# Patient Record
Sex: MALE | Race: WHITE | Employment: FULL TIME | URBAN - METROPOLITAN AREA
[De-identification: names, ages, dates, MRNs, and addresses within clinical notes are randomized per-mention and may not be internally consistent; named-entity substitution may affect disease eponyms.]

---

## 2018-02-28 ENCOUNTER — NURSE TRIAGE (OUTPATIENT)
Dept: NURSING | Facility: CLINIC | Age: 38
End: 2018-02-28

## 2018-02-28 ENCOUNTER — HOSPITAL ENCOUNTER (EMERGENCY)
Facility: CLINIC | Age: 38
Discharge: HOME | End: 2018-02-28
Attending: EMERGENCY MEDICINE | Admitting: EMERGENCY MEDICINE
Payer: COMMERCIAL

## 2018-02-28 VITALS
RESPIRATION RATE: 16 BRPM | BODY MASS INDEX: 32.23 KG/M2 | TEMPERATURE: 97.9 F | HEART RATE: 99 BPM | HEIGHT: 72 IN | WEIGHT: 238 LBS | DIASTOLIC BLOOD PRESSURE: 98 MMHG | OXYGEN SATURATION: 100 % | SYSTOLIC BLOOD PRESSURE: 142 MMHG

## 2018-02-28 DIAGNOSIS — S06.0X9A CONCUSSION WITH LOSS OF CONSCIOUSNESS, INITIAL ENCOUNTER: ICD-10-CM

## 2018-02-28 DIAGNOSIS — S01.01XA LACERATION OF SCALP WITHOUT FOREIGN BODY, INITIAL ENCOUNTER: ICD-10-CM

## 2018-02-28 PROCEDURE — 90715 TDAP VACCINE 7 YRS/> IM: CPT | Performed by: EMERGENCY MEDICINE

## 2018-02-28 PROCEDURE — 12002 RPR S/N/AX/GEN/TRNK2.6-7.5CM: CPT

## 2018-02-28 PROCEDURE — 99283 EMERGENCY DEPT VISIT LOW MDM: CPT | Mod: 25

## 2018-02-28 PROCEDURE — 90471 IMMUNIZATION ADMIN: CPT

## 2018-02-28 PROCEDURE — 25000132 ZZH RX MED GY IP 250 OP 250 PS 637: Performed by: EMERGENCY MEDICINE

## 2018-02-28 PROCEDURE — 25000128 H RX IP 250 OP 636: Performed by: EMERGENCY MEDICINE

## 2018-02-28 RX ORDER — HYDROCODONE BITARTRATE AND ACETAMINOPHEN 5; 325 MG/1; MG/1
1 TABLET ORAL ONCE
Status: COMPLETED | OUTPATIENT
Start: 2018-02-28 | End: 2018-02-28

## 2018-02-28 RX ORDER — IBUPROFEN 400 MG/1
800 TABLET, FILM COATED ORAL ONCE
Status: COMPLETED | OUTPATIENT
Start: 2018-02-28 | End: 2018-02-28

## 2018-02-28 RX ADMIN — IBUPROFEN 800 MG: 400 TABLET ORAL at 01:16

## 2018-02-28 RX ADMIN — CLOSTRIDIUM TETANI TOXOID ANTIGEN (FORMALDEHYDE INACTIVATED), CORYNEBACTERIUM DIPHTHERIAE TOXOID ANTIGEN (FORMALDEHYDE INACTIVATED), BORDETELLA PERTUSSIS TOXOID ANTIGEN (GLUTARALDEHYDE INACTIVATED), BORDETELLA PERTUSSIS FILAMENTOUS HEMAGGLUTININ ANTIGEN (FORMALDEHYDE INACTIVATED), BORDETELLA PERTUSSIS PERTACTIN ANTIGEN, AND BORDETELLA PERTUSSIS FIMBRIAE 2/3 ANTIGEN 0.5 ML: 5; 2; 2.5; 5; 3; 5 INJECTION, SUSPENSION INTRAMUSCULAR at 01:28

## 2018-02-28 RX ADMIN — HYDROCODONE BITARTRATE AND ACETAMINOPHEN 1 TABLET: 5; 325 TABLET ORAL at 02:23

## 2018-02-28 ASSESSMENT — ENCOUNTER SYMPTOMS
WOUND: 1
HEADACHES: 1
NAUSEA: 1
LIGHT-HEADEDNESS: 1

## 2018-02-28 NOTE — TELEPHONE ENCOUNTER
Patient calling reporting having 6 staples placed 2/27/18 following laceration on scalp. Patient bumped stapled area on car door today and area started to re bleed. Reports bleeding lasted less then 10 minutes. Staples intact. Bleeding controlled.  Home care advice given. Caller verbalized understanding. Denies further questions.     Additional Information    Negative: [1] Major abdominal surgical wound AND [2] visible internal organs    Negative: Sounds like a life-threatening emergency to the triager    Negative: Surgical incision symptoms and questions    Negative: New cut and caller wonders if it needs stitches    Negative: Skin glue (Dermabond) questions    Negative: Wound looks infected    Negative: [1] Bleeding from wound AND [2] won't stop after 10 minutes of direct pressure    Negative: [1] Suture came out early AND [2] wound gaping AND [3] < 48 hours since sutures placed    Negative: Patient sounds very sick or weak to the triager    Negative: [1] Wound gaping open AND [2] length of opening > 1/2 inch (6 mm) AND [3] > 48 hours since sutures placed    Negative: [1] Wound gaping open AND [2] on the face AND [3] > 48 hours since sutures placed    Negative: Suture removal is overdue    Negative: [1] Suture came out early AND [2] > 48 hours since sutures placed AND [3] caller wants wound checked    Negative: [1] Numbness extends beyond the wound edges AND [2] lasts > 8 hours    Negative: [1] Suture came out early AND [2] > 48 hours since sutures placed  (all triage questions negative)    Care of sutured wound,  questions about    Protocols used: SUTURE OR STAPLE QUESTIONS-[facility]-

## 2018-02-28 NOTE — DISCHARGE INSTRUCTIONS
"Concussion Discharge Instructions  You were seen today for signs of a concussion. The symptoms will vary, depending on the nature of your injury and your health. You may have: headache, confusion, nausea (feel sick to your stomach), vomiting (throwing up) and problems with memory, concentrating or sleep. You may feel dizzy, irritable, and tired.   Children and teens may need help from their parents, teachers and coaches to watch for symptoms as they recover.  Follow-up  It is important for you to see a doctor for follow-up care to see how you are recovering. Please see your primary doctor within the next 5 to 7 days. You may have also been referred to the Concussion  service. They will contact you and arrange a follow-up visit if needed. If you need help sooner, you may call them at [phone].  Warning signs  Call your doctor or come back to Emergency if you suddenly have any of these symptoms:    Headaches that get worse    Feeling more and more drowsy    You keep repeating yourself    Strange behavior    Seizures    Repeat vomiting (throwing up)    Trouble walking    Growing confusion    Feeling more irritable    Neck pain that gets worse    Slurred speech    Weakness or numbness    Loss of consciousness    Fluid or blood coming from ears or nose  Self-care    Get lots of rest and get enough sleep at night. Take daytime naps or rest if you feel tired.    Limit physical activity and \"thinking\" activities. These can make symptoms worse.    Physical activity includes gym, sports, weight training, running, exercise and heavy lifting.    Thinking activities include homework, class work, job-related work and screen time (phone, computer, tablet, TV and video games).    Stick to a healthy diet and drink lots of fluids.    As symptoms improve, you may slowly return to your daily activities. If symptoms get worse   or return, reduce your activity.    Know that it is normal to feel sad and frustrated when you do " not feel right and are less active.  Going back to work    Your care team will tell you when you are ready to return to work.    Limit the amount of work you do soon after your injury. This may speed healing. Take breaks if your symptoms get worse. You should also reduce your physical activity as well as activities that require a lot of thinking until you see your doctor.    You may need shorter work days and a lighter workload.    Avoid heavy lifting, working with machinery, driving and working at heights until your symptoms are gone or you are cleared by a doctor.  Returning to sports    Never return to play if you have any symptoms. A full recovery will reduce the chances of getting hurt again. Remember, it is better to miss one or two games than a whole season.    You should rest from all physical activity until you see your doctor. Generally, if all symptoms have completely cleared, your doctor can help guide you to slowly return to sports. If symptoms return or worsen, stop the activity and see your doctor.    Important: If you are in an organized sport and under age 18, you will need written consent from a healthcare provider before you return to sports. Typically, this will be your primary care or sports medicine doctor. Please make an appointment.  Going back to school    If you are still having symptoms, you may need extra help at school.    Tell your teachers and school nurse about your injury and symptoms. Ask them to watch for problems with learning, memory and concentrating. Symptoms may get worse when you do schoolwork, and you may become more irritable.    You may need shorter school days, a reduced workload, and to postpone testing.    Do not drive or take gym class (physical activity) until cleared by a doctor.  For informational purposes only. Not to replace the advice of your health care provider.   2009 [employer]. Used with permission. This form is adapted  "from the \"Heads Up: Brain Injury in Your Practice\" tool kit developed by the Centers for Disease Control and Prevention (CDC). All rights reserved. [employer] [id number] - Rev 03/17.      Scalp Laceration: Sutures or Staples  A laceration is a cut through the skin. A scalp laceration may require stitches (sutures) or staples. There are a lot of blood vessels in the scalp. Because of this, significant bleeding is common with scalp cuts.  Home care  The following guidelines will help you care for your laceration at home:    During the first 2 days you may carefully rinse your hair in the shower to remove blood and glass or dirt particles. After 2 days you may shower and shampoo your hair normally.    Have someone help you clean your wound every day:    In the shower, wash the area with soap and water. Use a wet cotton swab to loosen and remove any blood or crust that forms.    After cleaning, keep the wound clean and dry. Talk with your doctor about applying antibiotic ointment to the wound. Apply a fresh bandage.    Don't put your head underwater until the stitches or staples have been removed. This means no swimming.    Your doctor may prescribe an antibiotic cream or ointment to prevent infection. Do not stop taking this medication until you have finished the prescribed course or your doctor tells you to stop.    Your doctor may prescribe medications for pain. If no pain medicines were prescribed, you can use over-the-counter pain medicines. Follow instructions for taking these medications. Talk with your doctor before using these medicines if you have chronic liver or kidney disease. Also talk with your doctor if you have ever had a stomach ulcer or GI bleeding.  Follow-up care  Follow up with your healthcare provider, or as advised. Check the wound daily for the signs of infection listed below. Stitches or staples are usually removed from the scalp in about 10 to 14 days.  Call 911  Call " 911 if any of these occur:    Bleeding can't be controlled by direct pressure  When to seek medical advice  Call your healthcare provider right away if any of these occur:    Signs of infection, including increasing pain in the wound, redness, swelling, or pus coming from the wound    Fever of 100.4 F (38 C) or higher, or as directed by your healthcare provider    Stitches or staples come apart or fall out before your next appointment    Wound edges re-open  Date Last Reviewed: 10/1/2016    8044-9262 [employer]. [address] [place], [place] [address]. All rights reserved. This information is not intended as a substitute for professional medical care. Always follow your healthcare professional's instructions.

## 2018-02-28 NOTE — LETTER
February 28, 2018      To Whom It May Concern:      Yeison Martinez was seen in our Emergency Department today, 02/28/18.  I expect his condition to improve over the next 2 days.  He may return to work/school when improved.    Sincerely,        Shiela Granger RN

## 2018-02-28 NOTE — ED AVS SNAPSHOT
Emergency Department    [address] [facility] [address]    Phone:  [phone]    Fax:  [phone]                                       Yeison Martinez   MRN: [id number]    Department:   Emergency Department   Date of Visit:  2/28/2018           Patient Information     Date Of Birth          1980        Your diagnoses for this visit were:     Laceration of scalp without foreign body, initial encounter     Concussion with loss of consciousness, initial encounter        You were seen by Jodi Herrera MD.      Follow-up Information     Follow up with [facility] In 10 days.    Why:  For suture removal    Contact information:    [address] [facility] [address] [phone]          Follow up with  Emergency Department.    Specialty:  EMERGENCY MEDICINE    Why:  As needed    Contact information:    [address] [facility] [address] [phone]        Discharge Instructions       Concussion Discharge Instructions  You were seen today for signs of a concussion. The symptoms will vary, depending on the nature of your injury and your health. You may have: headache, confusion, nausea (feel sick to your stomach), vomiting (throwing up) and problems with memory, concentrating or sleep. You may feel dizzy, irritable, and tired.   Children and teens may need help from their parents, teachers and coaches to watch for symptoms as they recover.  Follow-up  It is important for you to see a doctor for follow-up care to see how you are recovering. Please see your primary doctor within the next 5 to 7 days. You may have also been referred to the Concussion  service. They will contact you and arrange a follow-up visit if needed. If you need help sooner, you may call them at [phone].  Warning signs  Call your doctor or come back to Emergency if you suddenly have any of these symptoms:    Headaches that get worse    Feeling more  "and more drowsy    You keep repeating yourself    Strange behavior    Seizures    Repeat vomiting (throwing up)    Trouble walking    Growing confusion    Feeling more irritable    Neck pain that gets worse    Slurred speech    Weakness or numbness    Loss of consciousness    Fluid or blood coming from ears or nose  Self-care    Get lots of rest and get enough sleep at night. Take daytime naps or rest if you feel tired.    Limit physical activity and \"thinking\" activities. These can make symptoms worse.    Physical activity includes gym, sports, weight training, running, exercise and heavy lifting.    Thinking activities include homework, class work, job-related work and screen time (phone, computer, tablet, TV and video games).    Stick to a healthy diet and drink lots of fluids.    As symptoms improve, you may slowly return to your daily activities. If symptoms get worse   or return, reduce your activity.    Know that it is normal to feel sad and frustrated when you do not feel right and are less active.  Going back to work    Your care team will tell you when you are ready to return to work.    Limit the amount of work you do soon after your injury. This may speed healing. Take breaks if your symptoms get worse. You should also reduce your physical activity as well as activities that require a lot of thinking until you see your doctor.    You may need shorter work days and a lighter workload.    Avoid heavy lifting, working with machinery, driving and working at heights until your symptoms are gone or you are cleared by a doctor.  Returning to sports    Never return to play if you have any symptoms. A full recovery will reduce the chances of getting hurt again. Remember, it is better to miss one or two games than a whole season.    You should rest from all physical activity until you see your doctor. Generally, if all symptoms have completely cleared, your doctor can help guide you to slowly return to sports. If " "symptoms return or worsen, stop the activity and see your doctor.    Important: If you are in an organized sport and under age 18, you will need written consent from a healthcare provider before you return to sports. Typically, this will be your primary care or sports medicine doctor. Please make an appointment.  Going back to school    If you are still having symptoms, you may need extra help at school.    Tell your teachers and school nurse about your injury and symptoms. Ask them to watch for problems with learning, memory and concentrating. Symptoms may get worse when you do schoolwork, and you may become more irritable.    You may need shorter school days, a reduced workload, and to postpone testing.    Do not drive or take gym class (physical activity) until cleared by a doctor.  For informational purposes only. Not to replace the advice of your health care provider.   2009 [employer]. Used with permission. This form is adapted from the \"Heads Up: Brain Injury in Your Practice\" tool kit developed by the Centers for Disease Control and Prevention (CDC). All rights reserved. [employer] [id number] - Rev 03/17.      Scalp Laceration: Sutures or Staples  A laceration is a cut through the skin. A scalp laceration may require stitches (sutures) or staples. There are a lot of blood vessels in the scalp. Because of this, significant bleeding is common with scalp cuts.  Home care  The following guidelines will help you care for your laceration at home:    During the first 2 days you may carefully rinse your hair in the shower to remove blood and glass or dirt particles. After 2 days you may shower and shampoo your hair normally.    Have someone help you clean your wound every day:    In the shower, wash the area with soap and water. Use a wet cotton swab to loosen and remove any blood or crust that forms.    After cleaning, keep the wound clean and dry. Talk " with your doctor about applying antibiotic ointment to the wound. Apply a fresh bandage.    Don't put your head underwater until the stitches or staples have been removed. This means no swimming.    Your doctor may prescribe an antibiotic cream or ointment to prevent infection. Do not stop taking this medication until you have finished the prescribed course or your doctor tells you to stop.    Your doctor may prescribe medications for pain. If no pain medicines were prescribed, you can use over-the-counter pain medicines. Follow instructions for taking these medications. Talk with your doctor before using these medicines if you have chronic liver or kidney disease. Also talk with your doctor if you have ever had a stomach ulcer or GI bleeding.  Follow-up care  Follow up with your healthcare provider, or as advised. Check the wound daily for the signs of infection listed below. Stitches or staples are usually removed from the scalp in about 10 to 14 days.  Call 911  Call 911 if any of these occur:    Bleeding can't be controlled by direct pressure  When to seek medical advice  Call your healthcare provider right away if any of these occur:    Signs of infection, including increasing pain in the wound, redness, swelling, or pus coming from the wound    Fever of 100.4 F (38 C) or higher, or as directed by your healthcare provider    Stitches or staples come apart or fall out before your next appointment    Wound edges re-open  Date Last Reviewed: 10/1/2016    3098-2940 [employer]. [address] [place], [place] [address]. All rights reserved. This information is not intended as a substitute for professional medical care. Always follow your healthcare professional's instructions.          24 Hour Appointment Hotline       To make an appointment at any [facility] clinic, call [phone] ([phone]). If you don't have a family doctor or clinic, we will help you find one. [facility] are  conveniently located to serve the needs of you and your family.             Review of your medicines      Our records show that you are taking the medicines listed below. If these are incorrect, please call your family doctor or clinic.        Dose / Directions Last dose taken    ALPRAZolam 0.5 MG tablet   Commonly known as:  XANAX   Dose:  0.5 mg        Take 0.5 mg by mouth 3 times daily as needed.   Refills:  0        cyclobenzaprine 10 MG tablet   Commonly known as:  FLEXERIL   Dose:  10 mg        Take 10 mg by mouth 3 times daily as needed.   Refills:  0        LYRICA PO        Take  by mouth.   Refills:  0        OXYCODONE HCL   Dose:  5 mg        5 mg 4 times daily   Refills:  0                Orders Needing Specimen Collection     None      Pending Results     No orders found from 2/26/2018 to 3/1/2018.            Pending Culture Results     No orders found from 2/26/2018 to 3/1/2018.            Pending Results Instructions     If you had any lab results that were not finalized at the time of your Discharge, you can call the ED Lab Result RN at [phone]. You will be contacted by this team for any positive Lab results or changes in treatment. The nurses are available 7 days a week from 10A to 6:30P.  You can leave a message 24 hours per day and they will return your call.        Test Results From Your Hospital Stay               Clinical Quality Measure: Blood Pressure Screening     Your blood pressure was checked while you were in the emergency department today. The last reading we obtained was  BP: (!) 140/91 . Please read the guidelines below about what these numbers mean and what you should do about them.  If your systolic blood pressure (the top number) is less than 120 and your diastolic blood pressure (the bottom number) is less than 80, then your blood pressure is normal. There is nothing more that you need to do about it.  If your systolic blood pressure (the top number) is 120-139 or your  "diastolic blood pressure (the bottom number) is 80-89, your blood pressure may be higher than it should be. You should have your blood pressure rechecked within a year by a primary care provider.  If your systolic blood pressure (the top number) is 140 or greater or your diastolic blood pressure (the bottom number) is 90 or greater, you may have high blood pressure. High blood pressure is treatable, but if left untreated over time it can put you at risk for heart attack, stroke, or kidney failure. You should have your blood pressure rechecked by a primary care provider within the next 4 weeks.  If your provider in the emergency department today gave you specific instructions to follow-up with your doctor or provider even sooner than that, you should follow that instruction and not wait for up to 4 weeks for your follow-up visit.        Thank you for choosing [facility]       Thank you for choosing [facility] for your care. Our goal is always to provide you with excellent care. Hearing back from our patients is one way we can continue to improve our services. Please take a few minutes to complete the written survey that you may receive in the mail after you visit with us. Thank you!        [employer] Information     [employer] lets you send messages to your doctor, view your test results, renew your prescriptions, schedule appointments and more. To sign up, go to [email or web address] . Click on \"Log in\" on the left side of the screen, which will take you to the Welcome page. Then click on \"Sign up Now\" on the right side of the page.     You will be asked to enter the access code listed below, as well as some personal information. Please follow the directions to create your username and password.     Your access code is: [email or web address]  Expires: 2018  3:01 AM     Your access code will  in 90 days. If you need help or a new code, please call your [facility] clinic or [phone].        Care EveryWhere ID     This is " your Care EveryWhere ID. This could be used by other organizations to access your [facility] medical records  [id number]        Equal Access to Services     QUAN NAM : Verna Hurd, jada jackson, melissa asher, rey pearce. So [facility] [phone].    ATENCIÓN: Si habla español, tiene a monge disposición servicios gratuitos de asistencia lingüística. Llame al [phone].    We comply with applicable federal civil rights laws and [place] laws. We do not discriminate on the basis of race, color, national origin, age, disability, sex, sexual orientation, or gender identity.            After Visit Summary       This is your record. Keep this with you and show to your community pharmacist(s) and doctor(s) at your next visit.

## 2018-02-28 NOTE — ED AVS SNAPSHOT
Emergency Department    [address] [place] [address]    Phone:  [phone]    Fax:  [phone]                                       Yeison Martinez   MRN: [id number]    Department:   Emergency Department   Date of Visit:  2/28/2018           After Visit Summary Signature Page     I have received my discharge instructions, and my questions have been answered. I have discussed any challenges I see with this plan with the nurse or doctor.    ..........................................................................................................................................  Patient/Patient Representative Signature      ..........................................................................................................................................  Patient Representative Print Name and Relationship to Patient    ..................................................               ................................................  Date                                            Time    ..........................................................................................................................................  Reviewed by Signature/Title    ...................................................              ..............................................  Date                                                            Time

## 2018-02-28 NOTE — ED PROVIDER NOTES
History     Chief Complaint:  Head laceration    HPI   Yeison Martinez is a 37 year old male, not up to date on his tetanus vaccination, who presents with a head laceration. The patient reports that he was walking up the stairs at home this evening when he stubbed his toe and tripped, hitting the top of his head on the banister. The patient's wife believes that he did lose consciousness for 15-30 sec before he yelled for help. Here in the ED the patient presents with some blurred vision, nausea, headache, and a laceration.    Allergies:  Ceclor  Sulfa drugs    Medications:    Flexeril  Lyrica  Xanax    Past Medical History:    DJD    Past Surgical History:    Back surgery    Family History:    The patient denies any relevant family medical history.     Social History:  The patient was accompanied to the ED by his wife.  Smoking Status: Yes  Smokeless Tobacco: No  Alcohol Use: No   Marital Status:   [2]    Review of Systems   Eyes: Positive for visual disturbance.   Gastrointestinal: Positive for nausea.   Skin: Positive for wound.   Neurological: Positive for syncope, light-headedness and headaches.   All other systems reviewed and are negative.    Physical Exam   Vitals:  Patient Vitals for the past 24 hrs:   BP Temp Temp src Heart Rate SpO2 Height Weight   02/28/18 0107 (!) 140/91 97.9  F (36.6  C) Oral 115 96 % 1.829 m (6') 108 kg (238 lb)        Physical Exam  General/Appearance: appears stated age, well-groomed, appears moderately uncomfortable -- covering eyes with towel  Head: 2cm laceration, full thickness, to superior scalp  Eyes: EOMI, no scleral injection, no icterus  ENT: MMM  Neck: supple, nl ROM, no stiffness  Cardiovascular: RRR, nl S1S2, no m/r/g, 2+ pulses in all 4 extremities, cap refill <2sec  Respiratory: CTAB, good air movement throughout, no wheezes/rhonchi/rales, no increased WOB, no retractions  Back: no lesions  GI: abd soft, non-distended, nttp,  no HSM, no rebound, no guarding, nl  BS  MSK: MILTON, good tone, no bony abnormality  Skin: warm and well-perfused, no rash, no edema, no ecchymosis, nl turgor  Neuro: GCS 15, alert and oriented, no gross focal neuro deficits  Psych: interacts appropriately  Heme: no petechia, no purpura, no active bleeding    Emergency Department Course     Procedures:     Laceration Repair      LACERATION:  A simple clean 3.5 cm laceration.    LOCATION:  Scalp.    FUNCTION:  Distally sensation are intact.    ANESTHESIA:  Local using Lidocaine 2% with epinephrine    PREPARATION:  Irrigation with Normal Saline and Betadine.    DEBRIDEMENT:  no debridement.    CLOSURE:  Wound was closed with 6 Staples.     Interventions:  0116 Advil 800 mg oral  0128 Tdap 0.5 mL IM  0223 Norco 5-325 mg 1 tablet oral    Emergency Department Course:  Nursing notes and vitals reviewed.  I performed an exam of the patient as documented above.     0245 I rechecked with the patient    I discussed the treatment plan with the patient. They expressed understanding of this plan and consented to discharge. They will be discharged home with instructions for care and follow up. In addition, the patient will return to the emergency department if their symptoms persist, worsen, if new symptoms arise or if there is any concern.  All questions were answered.     I personally answered all related questions prior to discharge.    Impression & Plan      Medical Decision Making:  This patient is a 37-year-old male with mechanical fall into a banister, suffering a head laceration.  This was numbed, cleaned, closed with 6 staples.  Given his symptoms of mild dizziness, nausea, slight sleepiness I do suspect he has a mild concussion.  He has been ambulating around the room in the ED as well as drinking liquids.  I do not think there is any reason to get head imaging as neurologically he is basically intact.  They were given precautions for concussion.    Diagnosis:    ICD-10-CM    1. Laceration of scalp without  foreign body, initial encounter S01.01XA    2. Concussion with loss of consciousness, initial encounter S06.0X9A         Disposition:   Discharged    CMS Diagnoses: None     Scribe Disclosure:  I, Jose Wray, am serving as a scribe at 1:08 AM on 2/28/2018 to document services personally performed by Jodi Herrera MD, based on my observations and the provider's statements to me.    EMERGENCY DEPARTMENT       Jodi Herrera MD  02/28/18 0308

## 2018-03-03 ENCOUNTER — APPOINTMENT (OUTPATIENT)
Dept: CT IMAGING | Facility: CLINIC | Age: 38
End: 2018-03-03
Attending: EMERGENCY MEDICINE
Payer: COMMERCIAL

## 2018-03-03 ENCOUNTER — HOSPITAL ENCOUNTER (EMERGENCY)
Facility: CLINIC | Age: 38
Discharge: HOME | End: 2018-03-03
Attending: EMERGENCY MEDICINE | Admitting: EMERGENCY MEDICINE
Payer: COMMERCIAL

## 2018-03-03 VITALS
TEMPERATURE: 98.1 F | WEIGHT: 230 LBS | SYSTOLIC BLOOD PRESSURE: 134 MMHG | HEART RATE: 90 BPM | HEIGHT: 72 IN | OXYGEN SATURATION: 96 % | DIASTOLIC BLOOD PRESSURE: 86 MMHG | RESPIRATION RATE: 16 BRPM | BODY MASS INDEX: 31.15 KG/M2

## 2018-03-03 DIAGNOSIS — F07.81 POST CONCUSSION SYNDROME: ICD-10-CM

## 2018-03-03 PROCEDURE — 70450 CT HEAD/BRAIN W/O DYE: CPT

## 2018-03-03 PROCEDURE — 99284 EMERGENCY DEPT VISIT MOD MDM: CPT | Mod: 25

## 2018-03-03 PROCEDURE — 25000132 ZZH RX MED GY IP 250 OP 250 PS 637: Performed by: EMERGENCY MEDICINE

## 2018-03-03 RX ORDER — IBUPROFEN 600 MG/1
600 TABLET, FILM COATED ORAL ONCE
Status: COMPLETED | OUTPATIENT
Start: 2018-03-03 | End: 2018-03-03

## 2018-03-03 RX ORDER — ACETAMINOPHEN 500 MG
1000 TABLET ORAL ONCE
Status: COMPLETED | OUTPATIENT
Start: 2018-03-03 | End: 2018-03-03

## 2018-03-03 RX ADMIN — ACETAMINOPHEN 1000 MG: 500 TABLET, FILM COATED ORAL at 08:53

## 2018-03-03 RX ADMIN — IBUPROFEN 600 MG: 600 TABLET ORAL at 08:53

## 2018-03-03 ASSESSMENT — ENCOUNTER SYMPTOMS
DIZZINESS: 1
SPEECH DIFFICULTY: 1
VOMITING: 0
SLEEP DISTURBANCE: 1
HEADACHES: 1

## 2018-03-03 NOTE — DISCHARGE INSTRUCTIONS
Treatment for Mild Traumatic Brain Injury (Concussion)  A mild traumatic brain injury (TBI) is a sudden jolt to your head that causes a temporary change in the way your brain works. It could be caused by a blow to your head, a blast, or a sudden and severe movement of your head that bounces your brain inside your skull. Falls, fights, sports, and car accidents also can cause TBIs.  Treatment of mild TBI   Having a mild TBI can change the way you feel, act, move, and think. Even though you may look fine, a mild TBI can have a big impact on many areas of your life. A mild TBI can cause headaches, fatigue, memory problems, mood swings, and inability to focus your thoughts.  Treatment for mild TBI may be different, depending on symptoms and other unrelated medical issues; therefore, no 2 TBIs are the same. You may need to work with a TBI team -- a group of healthcare providers who help people recover from TBI. For example, you might work with a physical therapist to help with your balance and movement problems. Or you might work with an occupational therapist to help you function better at home and at work. Other medical experts may help you with emotional and thinking problems.  In some cases, your doctor may use medicine to ease symptoms while you recover. These may include pain relievers, antidepressants, antianxiety medicine, sleep aids, and muscle relaxants. Although medicines can help, they are not a main part of treatment. You should not take any medicines unless discussed and approved by your healthcare provider. Things that you can do for yourself are usually as important as the medicines you are prescribed. This part of your treatment is called self-management.  Self-management for mild TBI  Most people with mild TBI recover completely, but it may take weeks or months. For some people, symptoms may continue for years. Because of this, self-management may continue long after you leave the hospital. Many  lifestyle changes that help your brain recover are good habits that you should keep up even after you have recovered. Here are some tips:      Learn as much as you can about TBI. Share what you learn with friends and family.    Let your health care team know about all your symptoms.    Eat a healthy diet and drink plenty of fluids.    Get plenty of sleep.    Don t overexert yourself mentally or physically.    Don t smoke, take drugs, or drink alcohol.    Don t use caffeine or energy drinks as a way to make you feel less tired.    Avoid activities that could cause another jolt to your head. Don't return to sports or any activity that could cause you to hit your head until all symptoms are gone and you have been cleared by your doctor. A second head injury before fully recovering from the first one can lead to serious brain injury. Ask your health care provider what types of activities are safe.    Avoid mental stress. Learn some relaxation techniques like deep breathing.    Keep a pad and pencil handy. Write things down if you are having trouble concentrating or remembering.  Let your healthcare provider know if your symptoms are getting worse. And look out for other important mental symptoms. These include memory loss, having a hard time thinking clearly, having trouble controlling your emotions, and depression. Also be sure to report physical symptoms such as worsening headaches, loss of balance, changes in vision, seizures, and vomiting.  Recovery from a mild TBI takes time. Be patient and give your brain time to heal. Be sure to rely on your support system, which includes friends and family members who understand what you are going through. You might also want to join a support group and share your feelings with others who have had a TBI.    Date Last Reviewed: 8/17/2015 2000-2017 [employer]. [address] [place] [address], [place] [address]. All rights reserved. This information is not intended as a  substitute for professional medical care. Always follow your healthcare professional's instructions.

## 2018-03-03 NOTE — ED AVS SNAPSHOT
Emergency Department    [address] [place] [id number]    Phone:  [phone]    Fax:  [phone]                                       Yeison Martinez   MRN: [id number]    Department:   Emergency Department   Date of Visit:  3/3/2018           After Visit Summary Signature Page     I have received my discharge instructions, and my questions have been answered. I have discussed any challenges I see with this plan with the nurse or doctor.    ..........................................................................................................................................  Patient/Patient Representative Signature      ..........................................................................................................................................  Patient Representative Print Name and Relationship to Patient    ..................................................               ................................................  Date                                            Time    ..........................................................................................................................................  Reviewed by Signature/Title    ...................................................              ..............................................  Date                                                            Time

## 2018-03-03 NOTE — ED AVS SNAPSHOT
Emergency Department    [address] [facility] [address]    Phone:  [phone]    Fax:  [phone]                                       Yeison Martinez   MRN: [id number]    Department:   Emergency Department   Date of Visit:  3/3/2018           Patient Information     Date Of Birth          1980        Your diagnoses for this visit were:     Post concussion syndrome        You were seen by Jan Arroyo MD.      Follow-up Information     Follow up with [facility]. Schedule an appointment as soon as possible for a visit in 1 week.    Why:  As needed, For repeat evaluation and symptom check    Contact information:    [address] [facility] [id number] [phone]          Follow up with  Emergency Department.    Specialty:  EMERGENCY MEDICINE    Why:  If symptoms worsen    Contact information:    [address] [facility] [id number] [phone]        Discharge Instructions         Treatment for Mild Traumatic Brain Injury (Concussion)  A mild traumatic brain injury (TBI) is a sudden jolt to your head that causes a temporary change in the way your brain works. It could be caused by a blow to your head, a blast, or a sudden and severe movement of your head that bounces your brain inside your skull. Falls, fights, sports, and car accidents also can cause TBIs.  Treatment of mild TBI   Having a mild TBI can change the way you feel, act, move, and think. Even though you may look fine, a mild TBI can have a big impact on many areas of your life. A mild TBI can cause headaches, fatigue, memory problems, mood swings, and inability to focus your thoughts.  Treatment for mild TBI may be different, depending on symptoms and other unrelated medical issues; therefore, no 2 TBIs are the same. You may need to work with a TBI team -- a group of healthcare providers who help people recover from TBI. For example, you might work with  a physical therapist to help with your balance and movement problems. Or you might work with an occupational therapist to help you function better at home and at work. Other medical experts may help you with emotional and thinking problems.  In some cases, your doctor may use medicine to ease symptoms while you recover. These may include pain relievers, antidepressants, antianxiety medicine, sleep aids, and muscle relaxants. Although medicines can help, they are not a main part of treatment. You should not take any medicines unless discussed and approved by your healthcare provider. Things that you can do for yourself are usually as important as the medicines you are prescribed. This part of your treatment is called self-management.  Self-management for mild TBI  Most people with mild TBI recover completely, but it may take weeks or months. For some people, symptoms may continue for years. Because of this, self-management may continue long after you leave the hospital. Many lifestyle changes that help your brain recover are good habits that you should keep up even after you have recovered. Here are some tips:      Learn as much as you can about TBI. Share what you learn with friends and family.    Let your health care team know about all your symptoms.    Eat a healthy diet and drink plenty of fluids.    Get plenty of sleep.    Don t overexert yourself mentally or physically.    Don t smoke, take drugs, or drink alcohol.    Don t use caffeine or energy drinks as a way to make you feel less tired.    Avoid activities that could cause another jolt to your head. Don't return to sports or any activity that could cause you to hit your head until all symptoms are gone and you have been cleared by your doctor. A second head injury before fully recovering from the first one can lead to serious brain injury. Ask your health care provider what types of activities are safe.    Avoid mental stress. Learn some relaxation  techniques like deep breathing.    Keep a pad and pencil handy. Write things down if you are having trouble concentrating or remembering.  Let your healthcare provider know if your symptoms are getting worse. And look out for other important mental symptoms. These include memory loss, having a hard time thinking clearly, having trouble controlling your emotions, and depression. Also be sure to report physical symptoms such as worsening headaches, loss of balance, changes in vision, seizures, and vomiting.  Recovery from a mild TBI takes time. Be patient and give your brain time to heal. Be sure to rely on your support system, which includes friends and family members who understand what you are going through. You might also want to join a support group and share your feelings with others who have had a TBI.    Date Last Reviewed: 8/17/2015 2000-2017 [employer]. [address] [place], [place] [id number]. All rights reserved. This information is not intended as a substitute for professional medical care. Always follow your healthcare professional's instructions.          24 Hour Appointment Hotline       To make an appointment at any [facility], call [phone] ([phone]). If you don't have a family doctor or clinic, we will help you find one. [facility] are conveniently located to serve the needs of you and your family.             Review of your medicines      Our records show that you are taking the medicines listed below. If these are incorrect, please call your family doctor or clinic.        Dose / Directions Last dose taken    ALPRAZolam 0.5 MG tablet   Commonly known as:  XANAX   Dose:  0.5 mg        Take 0.5 mg by mouth 3 times daily as needed.   Refills:  0        cyclobenzaprine 10 MG tablet   Commonly known as:  FLEXERIL   Dose:  10 mg        Take 10 mg by mouth 3 times daily as needed.   Refills:  0        LYRICA PO        Take  by mouth.   Refills:  0         OXYCODONE HCL   Dose:  5 mg        5 mg 4 times daily   Refills:  0                Procedures and tests performed during your visit     Head CT w/o contrast      Orders Needing Specimen Collection     None      Pending Results     Date and Time Order Name Status Description    3/3/2018 0919 Head CT w/o contrast Preliminary             Pending Culture Results     No orders found from 3/1/2018 to 3/4/2018.            Pending Results Instructions     If you had any lab results that were not finalized at the time of your Discharge, you can call the ED Lab Result RN at [phone]. You will be contacted by this team for any positive Lab results or changes in treatment. The nurses are available 7 days a week from 10A to 6:30P.  You can leave a message 24 hours per day and they will return your call.        Test Results From Your Hospital Stay        3/3/2018  9:41 AM      Narrative     CT SCAN OF THE HEAD WITHOUT CONTRAST   3/3/2018 9:39 AM     HISTORY: fall 2 days ago, worsening ha, please query bleed;     TECHNIQUE:  Axial images of the head and coronal reformations without  IV contrast material. Radiation dose for this scan was reduced using  automated exposure control, adjustment of the mA and/or kV according  to patient size, or iterative reconstruction technique.    COMPARISON: None.    FINDINGS:  The ventricles are normal in size, shape and configuration.   The brain parenchyma and subarachnoid spaces are normal. There is no  evidence of intracranial hemorrhage, mass, acute infarct or anomaly.  The visualized portions of the sinuses and mastoids appear normal.  Skin staples are seen over the frontal region. No intracranial  hemorrhage or skull fractures are identified.        Impression     IMPRESSION: No bleed or fracture identified. No brain contusions are  seen.                  Clinical Quality Measure: Blood Pressure Screening     Your blood pressure was checked while you were in the emergency department  "today. The last reading we obtained was  BP: 134/86 . Please read the guidelines below about what these numbers mean and what you should do about them.  If your systolic blood pressure (the top number) is less than 120 and your diastolic blood pressure (the bottom number) is less than 80, then your blood pressure is normal. There is nothing more that you need to do about it.  If your systolic blood pressure (the top number) is 120-139 or your diastolic blood pressure (the bottom number) is 80-89, your blood pressure may be higher than it should be. You should have your blood pressure rechecked within a year by a primary care provider.  If your systolic blood pressure (the top number) is 140 or greater or your diastolic blood pressure (the bottom number) is 90 or greater, you may have high blood pressure. High blood pressure is treatable, but if left untreated over time it can put you at risk for heart attack, stroke, or kidney failure. You should have your blood pressure rechecked by a primary care provider within the next 4 weeks.  If your provider in the emergency department today gave you specific instructions to follow-up with your doctor or provider even sooner than that, you should follow that instruction and not wait for up to 4 weeks for your follow-up visit.        Thank you for choosing [facility]       Thank you for choosing [facility] for your care. Our goal is always to provide you with excellent care. Hearing back from our patients is one way we can continue to improve our services. Please take a few minutes to complete the written survey that you may receive in the mail after you visit with us. Thank you!        [facility] Information     [facility] lets you send messages to your doctor, view your test results, renew your prescriptions, schedule appointments and more. To sign up, go to [email or web address] . Click on \"Log in\" on the left side of the screen, which will take you to the Welcome page. Then " "click on \"Sign up Now\" on the right side of the page.     You will be asked to enter the access code listed below, as well as some personal information. Please follow the directions to create your username and password.     Your access code is: [email or web address]  Expires: 2018  3:01 AM     Your access code will  in 90 days. If you need help or a new code, please call your [facility] clinic or [phone].        Care EveryWhere ID     This is your Care EveryWhere ID. This could be used by other organizations to access your [facility] medical records  [id number]        Equal Access to Services     [facility] : Verna Hurd, jada jackson, melissa asher, rey pearce. So [facility] [phone].    ATENCIÓN: Si habla español, tiene a monge disposición servicios gratuitos de asistencia lingüística. Llame al [phone].    We comply with applicable federal civil rights laws and [place] laws. We do not discriminate on the basis of race, color, national origin, age, disability, sex, sexual orientation, or gender identity.            After Visit Summary       This is your record. Keep this with you and show to your community pharmacist(s) and doctor(s) at your next visit.                  "
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
## 2018-03-10 ENCOUNTER — HOSPITAL ENCOUNTER (EMERGENCY)
Facility: CLINIC | Age: 38
Discharge: HOME | End: 2018-03-10
Admitting: EMERGENCY MEDICINE
Payer: COMMERCIAL

## 2018-03-10 PROCEDURE — 40000268 ZZH STATISTIC NO CHARGES

## 2018-03-10 NOTE — ED NOTES
6 staples removed from scalp. Wound is healed and approximated with no open areas. PAtient instructed on s/s of infection and to return to ER if these become present.

## 2019-01-02 ENCOUNTER — HOSPITAL ENCOUNTER (EMERGENCY)
Facility: CLINIC | Age: 39
Discharge: HOME | End: 2019-01-03
Attending: EMERGENCY MEDICINE | Admitting: EMERGENCY MEDICINE
Payer: COMMERCIAL

## 2019-01-02 VITALS
DIASTOLIC BLOOD PRESSURE: 76 MMHG | RESPIRATION RATE: 18 BRPM | SYSTOLIC BLOOD PRESSURE: 125 MMHG | HEIGHT: 72 IN | BODY MASS INDEX: 29.8 KG/M2 | OXYGEN SATURATION: 98 % | WEIGHT: 220 LBS | TEMPERATURE: 97.8 F

## 2019-01-02 DIAGNOSIS — T30.0 SUPERFICIAL BURN: ICD-10-CM

## 2019-01-02 PROCEDURE — 99283 EMERGENCY DEPT VISIT LOW MDM: CPT

## 2019-01-02 PROCEDURE — 16020 DRESS/DEBRID P-THICK BURN S: CPT

## 2019-01-02 PROCEDURE — 25000125 ZZHC RX 250: Performed by: EMERGENCY MEDICINE

## 2019-01-02 PROCEDURE — 25000132 ZZH RX MED GY IP 250 OP 250 PS 637: Performed by: EMERGENCY MEDICINE

## 2019-01-02 RX ORDER — IBUPROFEN 600 MG/1
600 TABLET, FILM COATED ORAL ONCE
Status: COMPLETED | OUTPATIENT
Start: 2019-01-02 | End: 2019-01-02

## 2019-01-02 RX ORDER — ACETAMINOPHEN 325 MG/1
975 TABLET ORAL ONCE
Status: COMPLETED | OUTPATIENT
Start: 2019-01-02 | End: 2019-01-02

## 2019-01-02 RX ORDER — IBUPROFEN 800 MG/1
800 TABLET, FILM COATED ORAL EVERY 8 HOURS PRN
Qty: 30 TABLET | Refills: 0 | Status: SHIPPED | OUTPATIENT
Start: 2019-01-02 | End: 2019-01-10

## 2019-01-02 RX ORDER — LIDOCAINE HYDROCHLORIDE 40 MG/ML
10 SOLUTION TOPICAL ONCE
Status: COMPLETED | OUTPATIENT
Start: 2019-01-02 | End: 2019-01-02

## 2019-01-02 RX ADMIN — LIDOCAINE HYDROCHLORIDE 10 ML: 40 SOLUTION TOPICAL at 23:24

## 2019-01-02 RX ADMIN — IBUPROFEN 600 MG: 600 TABLET ORAL at 23:24

## 2019-01-02 RX ADMIN — ACETAMINOPHEN 975 MG: 325 TABLET, FILM COATED ORAL at 23:23

## 2019-01-02 ASSESSMENT — ENCOUNTER SYMPTOMS: WOUND: 1

## 2019-01-02 ASSESSMENT — MIFFLIN-ST. JEOR: SCORE: 1965.91

## 2019-01-02 NOTE — ED AVS SNAPSHOT
Emergency Department  [address] [place] [id number]  Phone:  [phone]  Fax:  [phone]                                    Yeison Martinez   MRN: [id number]    Department:   Emergency Department   Date of Visit:  1/2/2019           After Visit Summary Signature Page    I have received my discharge instructions, and my questions have been answered. I have discussed any challenges I see with this plan with the nurse or doctor.    ..........................................................................................................................................  Patient/Patient Representative Signature      ..........................................................................................................................................  Patient Representative Print Name and Relationship to Patient    ..................................................               ................................................  Date                                   Time    ..........................................................................................................................................  Reviewed by Signature/Title    ...................................................              ..............................................  Date                                               Time          22EPIC Rev 08/18

## 2019-01-03 ASSESSMENT — ENCOUNTER SYMPTOMS
NUMBNESS: 0
VOMITING: 0
FEVER: 0
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
